# Patient Record
Sex: MALE | Race: WHITE | NOT HISPANIC OR LATINO | URBAN - METROPOLITAN AREA
[De-identification: names, ages, dates, MRNs, and addresses within clinical notes are randomized per-mention and may not be internally consistent; named-entity substitution may affect disease eponyms.]

---

## 2024-02-26 ENCOUNTER — EMERGENCY (EMERGENCY)
Facility: HOSPITAL | Age: 24
LOS: 1 days | Discharge: HOME | End: 2024-02-26
Admitting: EMERGENCY MEDICINE
Payer: MEDICAID

## 2024-02-26 VITALS
RESPIRATION RATE: 19 BRPM | OXYGEN SATURATION: 98 % | SYSTOLIC BLOOD PRESSURE: 134 MMHG | WEIGHT: 160.06 LBS | TEMPERATURE: 98 F | HEART RATE: 96 BPM | DIASTOLIC BLOOD PRESSURE: 77 MMHG

## 2024-02-26 LAB — HIV 1 & 2 AB SERPL IA.RAPID: SIGNIFICANT CHANGE UP

## 2024-02-26 PROCEDURE — 99284 EMERGENCY DEPT VISIT MOD MDM: CPT

## 2024-02-26 RX ORDER — IBUPROFEN 200 MG
1 TABLET ORAL
Qty: 15 | Refills: 0
Start: 2024-02-26

## 2024-02-26 RX ORDER — AMPICILLIN SODIUM AND SULBACTAM SODIUM 250; 125 MG/ML; MG/ML
3 INJECTION, POWDER, FOR SUSPENSION INTRAMUSCULAR; INTRAVENOUS ONCE
Refills: 0 | Status: COMPLETED | OUTPATIENT
Start: 2024-02-26 | End: 2024-02-26

## 2024-02-26 RX ORDER — KETOROLAC TROMETHAMINE 30 MG/ML
15 SYRINGE (ML) INJECTION ONCE
Refills: 0 | Status: DISCONTINUED | OUTPATIENT
Start: 2024-02-26 | End: 2024-02-26

## 2024-02-26 RX ADMIN — Medication 2 TABLET(S): at 22:56

## 2024-02-26 RX ADMIN — AMPICILLIN SODIUM AND SULBACTAM SODIUM 200 GRAM(S): 250; 125 INJECTION, POWDER, FOR SUSPENSION INTRAMUSCULAR; INTRAVENOUS at 22:57

## 2024-02-26 RX ADMIN — Medication 15 MILLIGRAM(S): at 22:56

## 2024-02-26 NOTE — ED PROVIDER NOTE - CARE PROVIDER_API CALL
Aubrey Drummond  Plastic Surgery  [address] [place] [id number]  Phone: [phone]  Fax: [phone]  Follow Up Time:

## 2024-02-26 NOTE — ED PROVIDER NOTE - OBJECTIVE STATEMENT
22 y/o M w/o medical history presenting for abscess on the back of his neck for the past three days. States it initially looked like a pimple but has gotten progressively larger everyday. Endorses soreness of his neck in the surrounding area. He had an abscess on the back of his neck ~ 1 year ago that was drained and he was given Bactrim with resolution of sx. He had four pills of the Bactrim left over from his prior infection and states he took two yesterday and two today. Since starting the Bactrim, the soreness in his neck has improved but abscess has not decreased in size. States he has been feeling feverish today but has not taken his temperature. He takes PREP for HIV prevention but otherwise does not take any other medications.     Denies nausea, vomiting, h/o HIV, diabetes, recent travel. 24 y/o M w/o medical history presenting for abscess on the back of his neck for the past three days. States it initially looked like a pimple but has gotten progressively larger everyday after shaving/trimming his hair around the region last week. Endorses soreness of his neck in the surrounding area. He had an abscess on the back of his neck ~ 1 year ago that was drained and he was given Bactrim with resolution of sx. He had four pills of the Bactrim left over from his prior infection and states he took two yesterday and two today. Since starting the Bactrim, the soreness in his neck has improved but abscess has not decreased in size. States he has been feeling feverish today but has not taken his temperature. He takes PREP for HIV prevention but otherwise does not take any other medications. Denies fever, chills, FB sensation, change in ROM/sensation, trauma, paresthesia, bleeding, N/V, HA, dizziness, LOC, CP, SOB, and focal weakness     Denies nausea, vomiting, h/o HIV, diabetes, recent travel.

## 2024-02-26 NOTE — ED ADULT NURSE NOTE - NSFALLUNIVINTERV_ED_ALL_ED
Bed/Stretcher in lowest position, wheels locked, appropriate side rails in place/Call bell, personal items and telephone in reach/Instruct patient to call for assistance before getting out of bed/chair/stretcher/Non-slip footwear applied when patient is off stretcher/[place] to call system/Physically safe environment - no spills, clutter or unnecessary equipment/Purposeful proactive rounding/Room/bathroom lighting operational, light cord in reach

## 2024-02-26 NOTE — ED PROVIDER NOTE - PATIENT PORTAL LINK FT
You can access the FollowMyHealth Patient Portal offered by [facility] by registering at the following website: [email or web address]. By joining [facility]’s FollowMyHealth portal, you will also be able to view your health information using other applications (apps) compatible with our system.

## 2024-02-26 NOTE — ED PROVIDER NOTE - PHYSICAL EXAMINATION
Constitutional: patient well groomed, well appearing, sitting comfortable on exam bed   Skin: [id number] erythematous abscess located L side of posterior neck, dried purulence noted, tender to palpation, no other rashes or ulcers noted   HEENT: PERRLA, EOMs intact   Neck: supple, no LAD, endorses pain with lateral rotation of the neck   Pulm: Breathing non-labored, no use of accessory muscles, breath sounds equal b/l, no wheezing, rales, or rhonchi   Cardiac: RRR, normal S1 and S2   Neuro: CNs II-VII grossly intact   Psych: A&Ox 3, mood and affect appropriate Constitutional: patient well groomed, well appearing, sitting comfortable on exam bed   Skin: [id number] erythematous semi-fluctuant abscess located on the L side of posterior neck, dried purulence noted, tender to palpation with mild induration, no active bleeding, dc, crepitus, streaking or circumferential involvement, FROM of the neck and jaw, no other rashes or ulcers noted   HEENT: PERRLA, EOMs intact   Neck: supple, no LAD, endorses pain with lateral rotation of the neck   Pulm: Breathing non-labored, no use of accessory muscles, breath sounds equal b/l, no wheezing, rales, or rhonchi   Cardiac: RRR, normal S1 and S2   Neuro: CNs II-VII grossly intact, ambulatory with steady gait   Psych: A&Ox 3, mood and affect appropriate

## 2024-02-26 NOTE — ED PROVIDER NOTE - CLINICAL SUMMARY MEDICAL DECISION MAKING FREE TEXT BOX
pt with fluctuant tender abscess on exam, on bactrim 1 tab BID x 2d with no improvement, +mild drainage on exam, afebrile and no systemic sx, no deep tissue involvement based on exam, FROM of the cervical and jaw region, plastic consulted and s/p I&D at bedside by Dr. Drummond, multiple loculations bluntly dissected, milked, and expressed, adequate hemostasis achieved s/p I&D, wound thoroughly irrigated with NS, packed with sterile packing strips and dressed with DSD, wound care instructions provided, wound c&s obtained and sent, given dose of IV unasyn and bactrim MRSA coverage strength in the ED. course of bactrim and augmentin, instructed to return in 2d for wound check or sooner for any s/s of worsening infxn. strict return precautions discussed, pt verbalized understanding.

## 2024-02-26 NOTE — ED ADULT NURSE NOTE - OBJECTIVE STATEMENT
pt reports left-sided neck abscess ongoing for around 5 days; denies fever, nausea, dizziness, SOB; pt alert & oriented x4, in no acute distress

## 2024-02-27 LAB
GRAM STN FLD: ABNORMAL
SPECIMEN SOURCE: SIGNIFICANT CHANGE UP

## 2024-02-28 LAB
-  AMPICILLIN/SULBACTAM: SIGNIFICANT CHANGE UP
-  CEFAZOLIN: SIGNIFICANT CHANGE UP
-  CLINDAMYCIN: SIGNIFICANT CHANGE UP
-  DAPTOMYCIN: SIGNIFICANT CHANGE UP
-  ERYTHROMYCIN: SIGNIFICANT CHANGE UP
-  GENTAMICIN: SIGNIFICANT CHANGE UP
-  LINEZOLID: SIGNIFICANT CHANGE UP
-  OXACILLIN: SIGNIFICANT CHANGE UP
-  PENICILLIN: SIGNIFICANT CHANGE UP
-  RIFAMPIN: SIGNIFICANT CHANGE UP
-  TETRACYCLINE: SIGNIFICANT CHANGE UP
-  TRIMETHOPRIM/SULFAMETHOXAZOLE: SIGNIFICANT CHANGE UP
-  VANCOMYCIN: SIGNIFICANT CHANGE UP
METHOD TYPE: SIGNIFICANT CHANGE UP

## 2024-02-29 DIAGNOSIS — L02.11 CUTANEOUS ABSCESS OF NECK: ICD-10-CM

## 2024-03-02 LAB
CULTURE RESULTS: ABNORMAL
ORGANISM # SPEC MICROSCOPIC CNT: ABNORMAL
ORGANISM # SPEC MICROSCOPIC CNT: SIGNIFICANT CHANGE UP
SPECIMEN SOURCE: SIGNIFICANT CHANGE UP